# Patient Record
Sex: FEMALE | Race: WHITE | Employment: FULL TIME | URBAN - NONMETROPOLITAN AREA
[De-identification: names, ages, dates, MRNs, and addresses within clinical notes are randomized per-mention and may not be internally consistent; named-entity substitution may affect disease eponyms.]

---

## 2021-03-02 PROBLEM — E66.01 CLASS 3 SEVERE OBESITY DUE TO EXCESS CALORIES WITHOUT SERIOUS COMORBIDITY WITH BODY MASS INDEX (BMI) OF 40.0 TO 44.9 IN ADULT (HCC): Status: ACTIVE | Noted: 2021-03-02

## 2021-04-07 PROBLEM — Z98.890 STATUS POST CARPAL TUNNEL RELEASE: Status: ACTIVE | Noted: 2021-04-07

## 2021-07-07 PROBLEM — E66.01 CLASS 3 SEVERE OBESITY DUE TO EXCESS CALORIES WITHOUT SERIOUS COMORBIDITY WITH BODY MASS INDEX (BMI) OF 40.0 TO 44.9 IN ADULT (HCC): Chronic | Status: ACTIVE | Noted: 2021-03-02

## 2021-07-07 PROBLEM — Z98.890 STATUS POST CARPAL TUNNEL RELEASE: Status: RESOLVED | Noted: 2021-04-07 | Resolved: 2021-07-07

## 2022-06-15 PROBLEM — F41.9 ANXIETY: Chronic | Status: ACTIVE | Noted: 2022-06-15

## 2022-11-29 PROBLEM — F90.2 ATTENTION DEFICIT HYPERACTIVITY DISORDER (ADHD), COMBINED TYPE: Status: ACTIVE | Noted: 2022-11-29

## 2022-11-29 PROBLEM — F31.0 BIPOLAR AFFECTIVE DISORDER, CURRENT EPISODE HYPOMANIC (HCC): Status: ACTIVE | Noted: 2022-11-29

## 2022-11-29 PROBLEM — F81.9 LEARNING DISABILITY: Status: ACTIVE | Noted: 2022-11-29

## 2023-07-10 ENCOUNTER — HOSPITAL ENCOUNTER (OUTPATIENT)
Dept: ULTRASOUND IMAGING | Age: 28
Discharge: HOME | End: 2023-07-12
Payer: COMMERCIAL

## 2023-07-10 DIAGNOSIS — O20.0 THREATENED ABORTION: ICD-10-CM

## 2023-07-10 PROCEDURE — 76817 TRANSVAGINAL US OBSTETRIC: CPT

## 2023-11-30 ENCOUNTER — TELEPHONE (OUTPATIENT)
Dept: OBGYN CLINIC | Age: 28
End: 2023-11-30

## 2023-11-30 NOTE — TELEPHONE ENCOUNTER
Spoke to patient, she is scheduled for a Repeat c/s with [facility] on 2/12/2024 at [facility] with Dr. Dayna Feldman. She is aware that we will sign consents at her 35-36 week visit and will get labs on admission. Patient verbalized understanding, no further questions/concerns voiced.